# Patient Record
Sex: FEMALE | Race: WHITE | NOT HISPANIC OR LATINO | Employment: OTHER
[De-identification: names, ages, dates, MRNs, and addresses within clinical notes are randomized per-mention and may not be internally consistent; named-entity substitution may affect disease eponyms.]

---

## 2017-05-31 ENCOUNTER — RECORDS - HEALTHEAST (OUTPATIENT)
Dept: ADMINISTRATIVE | Facility: OTHER | Age: 72
End: 2017-05-31

## 2017-06-06 ENCOUNTER — COMMUNICATION - HEALTHEAST (OUTPATIENT)
Dept: TELEHEALTH | Facility: CLINIC | Age: 72
End: 2017-06-06

## 2017-06-06 ENCOUNTER — HOSPITAL ENCOUNTER (OUTPATIENT)
Dept: ULTRASOUND IMAGING | Facility: CLINIC | Age: 72
Discharge: HOME | End: 2017-06-06
Attending: INTERNAL MEDICINE

## 2017-06-06 DIAGNOSIS — M81.8 OTHER OSTEOPOROSIS: ICD-10-CM

## 2017-06-06 DIAGNOSIS — E78.2 MIXED HYPERLIPIDEMIA: ICD-10-CM

## 2017-06-06 DIAGNOSIS — N18.30 CHRONIC RENAL DISEASE, STAGE III (H): ICD-10-CM

## 2022-08-05 ENCOUNTER — APPOINTMENT (OUTPATIENT)
Dept: RADIOLOGY | Facility: CLINIC | Age: 77
End: 2022-08-05
Attending: EMERGENCY MEDICINE
Payer: COMMERCIAL

## 2022-08-05 ENCOUNTER — HOSPITAL ENCOUNTER (EMERGENCY)
Facility: CLINIC | Age: 77
Discharge: HOME | End: 2022-08-05
Attending: EMERGENCY MEDICINE | Admitting: EMERGENCY MEDICINE
Payer: COMMERCIAL

## 2022-08-05 VITALS
DIASTOLIC BLOOD PRESSURE: 96 MMHG | TEMPERATURE: 98.4 F | RESPIRATION RATE: 20 BRPM | HEIGHT: 63 IN | WEIGHT: 115 LBS | SYSTOLIC BLOOD PRESSURE: 193 MMHG | OXYGEN SATURATION: 97 % | BODY MASS INDEX: 20.38 KG/M2 | HEART RATE: 77 BPM

## 2022-08-05 DIAGNOSIS — R07.89 ATYPICAL CHEST PAIN: ICD-10-CM

## 2022-08-05 DIAGNOSIS — M25.531 RIGHT WRIST PAIN: ICD-10-CM

## 2022-08-05 LAB
ALBUMIN SERPL-MCNC: 3.9 G/DL (ref 3.5–5)
ALP SERPL-CCNC: 92 U/L (ref 45–120)
ALT SERPL W P-5'-P-CCNC: 22 U/L (ref 0–45)
ANION GAP SERPL CALCULATED.3IONS-SCNC: 8 MMOL/L (ref 5–18)
AST SERPL W P-5'-P-CCNC: 20 U/L (ref 0–40)
ATRIAL RATE - MUSE: 81 BPM
BASOPHILS # BLD AUTO: 0 10E3/UL (ref 0–0.2)
BASOPHILS NFR BLD AUTO: 0 %
BILIRUB DIRECT SERPL-MCNC: 0.1 MG/DL
BILIRUB SERPL-MCNC: 0.4 MG/DL (ref 0–1)
BUN SERPL-MCNC: 19 MG/DL (ref 8–28)
CALCIUM SERPL-MCNC: 9.6 MG/DL (ref 8.5–10.5)
CHLORIDE BLD-SCNC: 109 MMOL/L (ref 98–107)
CO2 SERPL-SCNC: 24 MMOL/L (ref 22–31)
CREAT SERPL-MCNC: 0.95 MG/DL (ref 0.6–1.1)
DIASTOLIC BLOOD PRESSURE - MUSE: NORMAL MMHG
EOSINOPHIL # BLD AUTO: 0.1 10E3/UL (ref 0–0.7)
EOSINOPHIL NFR BLD AUTO: 2 %
ERYTHROCYTE [DISTWIDTH] IN BLOOD BY AUTOMATED COUNT: 13 % (ref 10–15)
GFR SERPL CREATININE-BSD FRML MDRD: 61 ML/MIN/1.73M2
GLUCOSE BLD-MCNC: 105 MG/DL (ref 70–125)
HCT VFR BLD AUTO: 36.7 % (ref 35–47)
HGB BLD-MCNC: 12.6 G/DL (ref 11.7–15.7)
HOLD SPECIMEN: NORMAL
HOLD SPECIMEN: NORMAL
IMM GRANULOCYTES # BLD: 0 10E3/UL
IMM GRANULOCYTES NFR BLD: 0 %
INTERPRETATION ECG - MUSE: NORMAL
LIPASE SERPL-CCNC: 24 U/L (ref 0–52)
LYMPHOCYTES # BLD AUTO: 1 10E3/UL (ref 0.8–5.3)
LYMPHOCYTES NFR BLD AUTO: 14 %
MCH RBC QN AUTO: 31.6 PG (ref 26.5–33)
MCHC RBC AUTO-ENTMCNC: 34.3 G/DL (ref 31.5–36.5)
MCV RBC AUTO: 92 FL (ref 78–100)
MONOCYTES # BLD AUTO: 0.6 10E3/UL (ref 0–1.3)
MONOCYTES NFR BLD AUTO: 9 %
NEUTROPHILS # BLD AUTO: 5.1 10E3/UL (ref 1.6–8.3)
NEUTROPHILS NFR BLD AUTO: 75 %
NRBC # BLD AUTO: 0 10E3/UL
NRBC BLD AUTO-RTO: 0 /100
P AXIS - MUSE: 38 DEGREES
PLATELET # BLD AUTO: 247 10E3/UL (ref 150–450)
POTASSIUM BLD-SCNC: 4 MMOL/L (ref 3.5–5)
PR INTERVAL - MUSE: 148 MS
PROT SERPL-MCNC: 6.8 G/DL (ref 6–8)
QRS DURATION - MUSE: 62 MS
QT - MUSE: 372 MS
QTC - MUSE: 432 MS
R AXIS - MUSE: 52 DEGREES
RBC # BLD AUTO: 3.99 10E6/UL (ref 3.8–5.2)
SODIUM SERPL-SCNC: 141 MMOL/L (ref 136–145)
SYSTOLIC BLOOD PRESSURE - MUSE: NORMAL MMHG
T AXIS - MUSE: 25 DEGREES
TROPONIN I SERPL-MCNC: <0.01 NG/ML (ref 0–0.29)
VENTRICULAR RATE- MUSE: 81 BPM
WBC # BLD AUTO: 6.8 10E3/UL (ref 4–11)

## 2022-08-05 PROCEDURE — 71046 X-RAY EXAM CHEST 2 VIEWS: CPT

## 2022-08-05 PROCEDURE — 93005 ELECTROCARDIOGRAM TRACING: CPT | Performed by: EMERGENCY MEDICINE

## 2022-08-05 PROCEDURE — 82248 BILIRUBIN DIRECT: CPT | Performed by: EMERGENCY MEDICINE

## 2022-08-05 PROCEDURE — 84484 ASSAY OF TROPONIN QUANT: CPT | Performed by: EMERGENCY MEDICINE

## 2022-08-05 PROCEDURE — 85025 COMPLETE CBC W/AUTO DIFF WBC: CPT | Performed by: EMERGENCY MEDICINE

## 2022-08-05 PROCEDURE — 36415 COLL VENOUS BLD VENIPUNCTURE: CPT | Performed by: EMERGENCY MEDICINE

## 2022-08-05 PROCEDURE — 250N000013 HC RX MED GY IP 250 OP 250 PS 637: Performed by: EMERGENCY MEDICINE

## 2022-08-05 PROCEDURE — 99285 EMERGENCY DEPT VISIT HI MDM: CPT | Mod: 25

## 2022-08-05 PROCEDURE — 83690 ASSAY OF LIPASE: CPT | Performed by: EMERGENCY MEDICINE

## 2022-08-05 PROCEDURE — 82310 ASSAY OF CALCIUM: CPT | Performed by: EMERGENCY MEDICINE

## 2022-08-05 RX ORDER — ACETAMINOPHEN 325 MG/1
975 TABLET ORAL ONCE
Status: COMPLETED | OUTPATIENT
Start: 2022-08-05 | End: 2022-08-05

## 2022-08-05 RX ADMIN — ACETAMINOPHEN 975 MG: 325 TABLET ORAL at 16:05

## 2022-08-05 ASSESSMENT — ENCOUNTER SYMPTOMS
NAUSEA: 0
SHORTNESS OF BREATH: 0
DIARRHEA: 0
DIZZINESS: 0
SORE THROAT: 0
ABDOMINAL PAIN: 0
JOINT SWELLING: 1
DYSURIA: 0
CONFUSION: 0
CHILLS: 0
DIAPHORESIS: 0
COUGH: 0
FEVER: 0
VOMITING: 0
HEMATURIA: 0

## 2022-08-05 NOTE — ED PROVIDER NOTES
"  Emergency Department Encounter     Evaluation Date & Time:   No admission date for patient encounter.    CHIEF COMPLAINT:  Arm Pain and Chest Pain      Triage Note:  Arrives to ED with c/o RUE pain x1 week. Denies injury. Reports has been using heating pad without relief. Also reports intermittent \"pinching\" pain to chest for 1-2 weeks. Endorses epigastric abd pain x1-2 weeks.                ED COURSE & MEDICAL DECISION MAKING:     ED Course as of 08/05/22 2011   Fri Aug 05, 2022   1601 Labs all unremarkable.    CXR (independent interpretation): no acute cardiopulmonary process    1626 Pt given wrist brace, feels much better. Discussed follow up, expected course, return precautions.       Pt presenting with primary concern of right dorsal wrist pain. Pt is right hand dominant, reports heavy use of phone playing a game where she uses right hand/fingers to swipe.  Pt denies any actual injury/trauma. She has a little swelling to dorsal ulnar wrist with pain on certain ROM, but no rash, no open wounds.  Suspect this is more overuse injury.  She incidentally comments on some very brief, pinching pain to chest for some time, lasting seconds.  Also at times having epigastric pain primarily in the morning.  Getting labs, CXR to rule out more nefarious pathology. Discussed with pt/family using wrist brace, icing, tylenol, outpatient follow up with expected course and return precautions pending workup here.  Pt and family agreeable.    2:46 PM I met with the patient for the initial interview and physical examination. Discussed plan for treatment and workup in the ED.    4:22 PM I rechecked and updated the patient.   4:24 PM We discussed the plan for discharge and the patient is agreeable. Reviewed supportive cares, symptomatic treatment, outpatient follow up, and reasons to return to the Emergency Department. All questions and concerns were addressed. Patient to be discharged by ED RN.      At the conclusion of the " "encounter I discussed the results of all the tests and the disposition. The questions were answered. The patient or family acknowledged understanding and was agreeable with the care plan.      MEDICATIONS GIVEN IN THE EMERGENCY DEPARTMENT:  Medications   acetaminophen (TYLENOL) tablet 975 mg (975 mg Oral Given 8/5/22 1605)       NEW PRESCRIPTIONS STARTED AT TODAY'S ED VISIT:  There are no discharge medications for this patient.      HPI   HPI     Dea Tong is a 77 year old female with a pertinent history of osteoporosis and hyperlipidemia who presents to this ED via private vehicle for evaluation of arm pain and chest pain.    Patient reports that she is experiencing pain in her right hand/wrist with associated swelling and redness that is exacerbated by twisting the hand; she does add that she is right-handed and commonly plays games on her phone. She notes that she also has been having chest pain that she describes as a pinch in her heart for a few seconds at a time per episode as well as epigastric abdominal pain that occurs every morning that gradually improves throughout the day. She does endorse severe right shoulder pain last week that improved with the application of heat and has since resolved without treatment. Patient denies recent fall or trauma. Patient denies shortness of breath, diaphoresis, vomiting, cough, fever, or additional symptoms or complaints at this time.     REVIEW OF SYSTEMS:  Review of Systems   Constitutional: Negative for chills, diaphoresis and fever.   HENT: Negative for sore throat.    Eyes: Negative for visual disturbance.   Respiratory: Negative for cough and shortness of breath.    Cardiovascular: Positive for chest pain (\"pinches\" in heart).   Gastrointestinal: Negative for abdominal pain, diarrhea, nausea and vomiting.   Endocrine: Negative for polyuria.   Genitourinary: Negative for dysuria and hematuria.        - urinary changes   Musculoskeletal: Positive for joint " "swelling (right wrist).   Skin: Negative for rash.   Neurological: Negative for dizziness.   Psychiatric/Behavioral: Negative for confusion.   All other systems reviewed and are negative.        Medical History   History reviewed. No pertinent past medical history.    History reviewed. No pertinent surgical history.    History reviewed. No pertinent family history.         No current outpatient medications on file.      Physical Exam     Vitals:  BP (!) 193/96   Pulse 77   Temp 98.4  F (36.9  C) (Temporal)   Resp 20   Ht 1.607 m (5' 3.25\")   Wt 52.2 kg (115 lb)   SpO2 97%   BMI 20.21 kg/m      PHYSICAL EXAM:     Physical Exam  Vitals and nursing note reviewed.   Constitutional:       General: She is not in acute distress.     Appearance: Normal appearance.   HENT:      Head: Normocephalic and atraumatic.      Nose: Nose normal.      Mouth/Throat:      Mouth: Mucous membranes are moist.   Eyes:      Pupils: Pupils are equal, round, and reactive to light.   Cardiovascular:      Rate and Rhythm: Normal rate and regular rhythm.      Pulses: Normal pulses.           Radial pulses are 2+ on the right side and 2+ on the left side.        Dorsalis pedis pulses are 2+ on the right side and 2+ on the left side.   Pulmonary:      Effort: Pulmonary effort is normal. No respiratory distress.      Breath sounds: Normal breath sounds.   Abdominal:      Palpations: Abdomen is soft.      Tenderness: There is no abdominal tenderness.   Musculoskeletal:      Right wrist: Swelling (right dorsal ulnar wrist minimal swelling; pain with full range of motion and mild TTP) present. No rash or open wound. No joint effusion, no involvement of fingers and 5/5 strength intact     Cervical back: Full passive range of motion without pain and neck supple.      Comments: No calf tenderness or swelling b/l   Skin:     General: Skin is warm.      Findings: No rash.   Neurological:      General: No focal deficit present.      Mental Status: She " is alert. Mental status is at baseline.      Comments: Fluent speech, no acute lateralizing deficits   Psychiatric:         Mood and Affect: Mood normal.         Behavior: Behavior normal.       Results     LAB:  All pertinent labs reviewed and interpreted  Labs Ordered and Resulted from Time of ED Arrival to Time of ED Departure   BASIC METABOLIC PANEL - Abnormal       Result Value    Sodium 141      Potassium 4.0      Chloride 109 (*)     Carbon Dioxide (CO2) 24      Anion Gap 8      Urea Nitrogen 19      Creatinine 0.95      Calcium 9.6      Glucose 105      GFR Estimate 61     TROPONIN I - Normal    Troponin I <0.01     LIPASE - Normal    Lipase 24     HEPATIC FUNCTION PANEL - Normal    Bilirubin Total 0.4      Bilirubin Direct 0.1      Protein Total 6.8      Albumin 3.9      Alkaline Phosphatase 92      AST 20      ALT 22     CBC WITH PLATELETS AND DIFFERENTIAL    WBC Count 6.8      RBC Count 3.99      Hemoglobin 12.6      Hematocrit 36.7      MCV 92      MCH 31.6      MCHC 34.3      RDW 13.0      Platelet Count 247      % Neutrophils 75      % Lymphocytes 14      % Monocytes 9      % Eosinophils 2      % Basophils 0      % Immature Granulocytes 0      NRBCs per 100 WBC 0      Absolute Neutrophils 5.1      Absolute Lymphocytes 1.0      Absolute Monocytes 0.6      Absolute Eosinophils 0.1      Absolute Basophils 0.0      Absolute Immature Granulocytes 0.0      Absolute NRBCs 0.0         RADIOLOGY:  XR Chest 2 Views   Final Result   IMPRESSION: Hyperexpanded lungs. Lungs are clear. Upper normal heart size. Tortuous and atherosclerotic aorta. No pleural effusion or pneumothorax. Bony demineralization.                         ECG:  none    PROCEDURES:  Procedures:  none      FINAL IMPRESSION:    ICD-10-CM    1. Right wrist pain  M25.531    2. Atypical chest pain  R07.89        0 minutes of critical care time      Ralf ESPOSITO, am serving as a scribe to document services personally performed by Dr. Grant West,  based on my observations and the provider's statements to me. I, Grant West, DO attest that Ralf Wadsworth is acting in a scribe capacity, has observed my performance of the services and has documented them in accordance with my direction.      Grant West DO  Emergency Medicine  [facility] EMERGENCY ROOM  8/5/2022  2:54 PM        Grant West MD  08/05/22 2011

## 2022-08-05 NOTE — DISCHARGE INSTRUCTIONS
Follow up closely with primary clinic for ongoing evaluation and care. Take tylenol for pain/discomfort.  Use brace for comfort.  Ok to remove and ice area for 15 minutes at a time. Try and reduce activity playing game with right hand.  Symptoms should improve over next week.  Return for worsening swelling/redness or other new concerns.

## 2022-08-05 NOTE — ED TRIAGE NOTES
"Arrives to ED with c/o RUE pain x1 week. Denies injury. Reports has been using heating pad without relief. Also reports intermittent \"pinching\" pain to chest for 1-2 weeks. Endorses epigastric abd pain x1-2 weeks.      Triage Assessment     Row Name 08/05/22 1434       Triage Assessment (Adult)    Airway WDL WDL       Respiratory WDL    Respiratory WDL WDL       Skin Circulation/Temperature WDL    Skin Circulation/Temperature WDL WDL       Cardiac WDL    Cardiac WDL X;chest pain  HTN       Peripheral/Neurovascular WDL    Peripheral Neurovascular WDL WDL       Cognitive/Neuro/Behavioral WDL    Cognitive/Neuro/Behavioral WDL WDL              "